# Patient Record
Sex: FEMALE | Race: WHITE | NOT HISPANIC OR LATINO | Employment: OTHER | URBAN - METROPOLITAN AREA
[De-identification: names, ages, dates, MRNs, and addresses within clinical notes are randomized per-mention and may not be internally consistent; named-entity substitution may affect disease eponyms.]

---

## 2022-09-10 ENCOUNTER — HOSPITAL ENCOUNTER (INPATIENT)
Facility: MEDICAL CENTER | Age: 72
LOS: 1 days | DRG: 183 | End: 2022-09-12
Attending: EMERGENCY MEDICINE | Admitting: STUDENT IN AN ORGANIZED HEALTH CARE EDUCATION/TRAINING PROGRAM
Payer: COMMERCIAL

## 2022-09-10 DIAGNOSIS — R79.89 ELEVATED TROPONIN: ICD-10-CM

## 2022-09-10 DIAGNOSIS — S22.42XA CLOSED FRACTURE OF MULTIPLE RIBS OF LEFT SIDE, INITIAL ENCOUNTER: ICD-10-CM

## 2022-09-10 DIAGNOSIS — S32.009A CLOSED FRACTURE OF TRANSVERSE PROCESS OF LUMBAR VERTEBRA, INITIAL ENCOUNTER (HCC): ICD-10-CM

## 2022-09-10 PROCEDURE — 99285 EMERGENCY DEPT VISIT HI MDM: CPT

## 2022-09-10 PROCEDURE — 36415 COLL VENOUS BLD VENIPUNCTURE: CPT

## 2022-09-10 PROCEDURE — 83930 ASSAY OF BLOOD OSMOLALITY: CPT

## 2022-09-10 ASSESSMENT — FIBROSIS 4 INDEX: FIB4 SCORE: 2.35

## 2022-09-11 ENCOUNTER — APPOINTMENT (OUTPATIENT)
Dept: CARDIOLOGY | Facility: MEDICAL CENTER | Age: 72
DRG: 183 | End: 2022-09-11
Attending: STUDENT IN AN ORGANIZED HEALTH CARE EDUCATION/TRAINING PROGRAM
Payer: COMMERCIAL

## 2022-09-11 PROBLEM — N17.9 AKI (ACUTE KIDNEY INJURY) (HCC): Status: ACTIVE | Noted: 2022-09-11

## 2022-09-11 PROBLEM — J96.00 ACUTE RESPIRATORY FAILURE (HCC): Status: ACTIVE | Noted: 2022-09-11

## 2022-09-11 PROBLEM — S22.39XA RIB FRACTURE: Status: ACTIVE | Noted: 2022-09-11

## 2022-09-11 PROBLEM — E87.1 HYPONATREMIA: Status: ACTIVE | Noted: 2022-09-11

## 2022-09-11 PROBLEM — R79.89 ELEVATED TROPONIN: Status: ACTIVE | Noted: 2022-09-11

## 2022-09-11 PROBLEM — J96.01 ACUTE RESPIRATORY FAILURE WITH HYPOXIA (HCC): Status: ACTIVE | Noted: 2022-09-11

## 2022-09-11 PROBLEM — J96.00 ACUTE RESPIRATORY FAILURE DUE TO COVID-19 (HCC): Status: ACTIVE | Noted: 2022-09-11

## 2022-09-11 PROBLEM — D64.9 ANEMIA: Status: ACTIVE | Noted: 2022-09-11

## 2022-09-11 PROBLEM — U07.1 ACUTE RESPIRATORY FAILURE DUE TO COVID-19 (HCC): Status: ACTIVE | Noted: 2022-09-11

## 2022-09-11 LAB
ANION GAP SERPL CALC-SCNC: 7 MMOL/L (ref 7–16)
BASOPHILS # BLD AUTO: 0.8 % (ref 0–1.8)
BASOPHILS # BLD: 0.09 K/UL (ref 0–0.12)
BUN SERPL-MCNC: 27 MG/DL (ref 8–22)
CALCIUM SERPL-MCNC: 8.7 MG/DL (ref 8.5–10.5)
CHLORIDE SERPL-SCNC: 96 MMOL/L (ref 96–112)
CK SERPL-CCNC: 92 U/L (ref 0–154)
CO2 SERPL-SCNC: 27 MMOL/L (ref 20–33)
CREAT SERPL-MCNC: 1.47 MG/DL (ref 0.5–1.4)
EKG IMPRESSION: NORMAL
EOSINOPHIL # BLD AUTO: 0.06 K/UL (ref 0–0.51)
EOSINOPHIL NFR BLD: 0.5 % (ref 0–6.9)
ERYTHROCYTE [DISTWIDTH] IN BLOOD BY AUTOMATED COUNT: 51.8 FL (ref 35.9–50)
GFR SERPLBLD CREATININE-BSD FMLA CKD-EPI: 37 ML/MIN/1.73 M 2
GLUCOSE SERPL-MCNC: 135 MG/DL (ref 65–99)
HCT VFR BLD AUTO: 32.1 % (ref 37–47)
HGB BLD-MCNC: 10.9 G/DL (ref 12–16)
IMM GRANULOCYTES # BLD AUTO: 0.07 K/UL (ref 0–0.11)
IMM GRANULOCYTES NFR BLD AUTO: 0.6 % (ref 0–0.9)
LV EJECT FRACT  99904: 60
LV EJECT FRACT MOD 2C 99903: 58.08
LV EJECT FRACT MOD 4C 99902: 65.99
LV EJECT FRACT MOD BP 99901: 61.88
LYMPHOCYTES # BLD AUTO: 1.09 K/UL (ref 1–4.8)
LYMPHOCYTES NFR BLD: 9.4 % (ref 22–41)
MAGNESIUM SERPL-MCNC: 1.3 MG/DL (ref 1.5–2.5)
MCH RBC QN AUTO: 35.7 PG (ref 27–33)
MCHC RBC AUTO-ENTMCNC: 34 G/DL (ref 33.6–35)
MCV RBC AUTO: 105.2 FL (ref 81.4–97.8)
MONOCYTES # BLD AUTO: 1.3 K/UL (ref 0–0.85)
MONOCYTES NFR BLD AUTO: 11.2 % (ref 0–13.4)
NEUTROPHILS # BLD AUTO: 9 K/UL (ref 2–7.15)
NEUTROPHILS NFR BLD: 77.5 % (ref 44–72)
NRBC # BLD AUTO: 0 K/UL
NRBC BLD-RTO: 0 /100 WBC
NT-PROBNP SERPL IA-MCNC: 4572 PG/ML (ref 0–125)
OSMOLALITY SERPL: 285 MOSM/KG H2O (ref 278–298)
OSMOLALITY UR: 483 MOSM/KG H2O (ref 300–900)
PHOSPHATE SERPL-MCNC: 3.3 MG/DL (ref 2.5–4.5)
PLATELET # BLD AUTO: 301 K/UL (ref 164–446)
PMV BLD AUTO: 9.5 FL (ref 9–12.9)
POTASSIUM SERPL-SCNC: 3.8 MMOL/L (ref 3.6–5.5)
PROCALCITONIN SERPL-MCNC: 0.14 NG/ML
RBC # BLD AUTO: 3.05 M/UL (ref 4.2–5.4)
SODIUM SERPL-SCNC: 130 MMOL/L (ref 135–145)
SODIUM SERPL-SCNC: 131 MMOL/L (ref 135–145)
SODIUM SERPL-SCNC: 133 MMOL/L (ref 135–145)
SODIUM UR-SCNC: 60 MMOL/L
TROPONIN T SERPL-MCNC: 100 NG/L (ref 6–19)
TROPONIN T SERPL-MCNC: 108 NG/L (ref 6–19)
TROPONIN T SERPL-MCNC: 64 NG/L (ref 6–19)
TROPONIN T SERPL-MCNC: 71 NG/L (ref 6–19)
TROPONIN T SERPL-MCNC: 89 NG/L (ref 6–19)
WBC # BLD AUTO: 11.6 K/UL (ref 4.8–10.8)

## 2022-09-11 PROCEDURE — 80048 BASIC METABOLIC PNL TOTAL CA: CPT

## 2022-09-11 PROCEDURE — 93306 TTE W/DOPPLER COMPLETE: CPT | Mod: 26 | Performed by: INTERNAL MEDICINE

## 2022-09-11 PROCEDURE — 36415 COLL VENOUS BLD VENIPUNCTURE: CPT

## 2022-09-11 PROCEDURE — 84300 ASSAY OF URINE SODIUM: CPT

## 2022-09-11 PROCEDURE — 84484 ASSAY OF TROPONIN QUANT: CPT | Mod: 91

## 2022-09-11 PROCEDURE — 83735 ASSAY OF MAGNESIUM: CPT

## 2022-09-11 PROCEDURE — 96365 THER/PROPH/DIAG IV INF INIT: CPT

## 2022-09-11 PROCEDURE — 99221 1ST HOSP IP/OBS SF/LOW 40: CPT | Performed by: SURGERY

## 2022-09-11 PROCEDURE — 83935 ASSAY OF URINE OSMOLALITY: CPT

## 2022-09-11 PROCEDURE — 700102 HCHG RX REV CODE 250 W/ 637 OVERRIDE(OP): Performed by: STUDENT IN AN ORGANIZED HEALTH CARE EDUCATION/TRAINING PROGRAM

## 2022-09-11 PROCEDURE — 99223 1ST HOSP IP/OBS HIGH 75: CPT | Performed by: STUDENT IN AN ORGANIZED HEALTH CARE EDUCATION/TRAINING PROGRAM

## 2022-09-11 PROCEDURE — 82550 ASSAY OF CK (CPK): CPT

## 2022-09-11 PROCEDURE — 83880 ASSAY OF NATRIURETIC PEPTIDE: CPT

## 2022-09-11 PROCEDURE — 96372 THER/PROPH/DIAG INJ SC/IM: CPT

## 2022-09-11 PROCEDURE — 93306 TTE W/DOPPLER COMPLETE: CPT

## 2022-09-11 PROCEDURE — 700111 HCHG RX REV CODE 636 W/ 250 OVERRIDE (IP): Performed by: STUDENT IN AN ORGANIZED HEALTH CARE EDUCATION/TRAINING PROGRAM

## 2022-09-11 PROCEDURE — 93005 ELECTROCARDIOGRAM TRACING: CPT | Performed by: EMERGENCY MEDICINE

## 2022-09-11 PROCEDURE — 96366 THER/PROPH/DIAG IV INF ADDON: CPT

## 2022-09-11 PROCEDURE — 85025 COMPLETE CBC W/AUTO DIFF WBC: CPT

## 2022-09-11 PROCEDURE — 84100 ASSAY OF PHOSPHORUS: CPT

## 2022-09-11 PROCEDURE — A9270 NON-COVERED ITEM OR SERVICE: HCPCS | Performed by: STUDENT IN AN ORGANIZED HEALTH CARE EDUCATION/TRAINING PROGRAM

## 2022-09-11 PROCEDURE — 84295 ASSAY OF SERUM SODIUM: CPT

## 2022-09-11 PROCEDURE — 84145 PROCALCITONIN (PCT): CPT

## 2022-09-11 PROCEDURE — 770020 HCHG ROOM/CARE - TELE (206)

## 2022-09-11 RX ORDER — POLYETHYLENE GLYCOL 3350 17 G/17G
1 POWDER, FOR SOLUTION ORAL
Status: DISCONTINUED | OUTPATIENT
Start: 2022-09-11 | End: 2022-09-12 | Stop reason: HOSPADM

## 2022-09-11 RX ORDER — LABETALOL HYDROCHLORIDE 5 MG/ML
10 INJECTION, SOLUTION INTRAVENOUS EVERY 4 HOURS PRN
Status: DISCONTINUED | OUTPATIENT
Start: 2022-09-11 | End: 2022-09-12 | Stop reason: HOSPADM

## 2022-09-11 RX ORDER — VALSARTAN AND HYDROCHLOROTHIAZIDE 160; 12.5 MG/1; MG/1
1 TABLET, FILM COATED ORAL DAILY
COMMUNITY

## 2022-09-11 RX ORDER — BISACODYL 10 MG
10 SUPPOSITORY, RECTAL RECTAL
Status: DISCONTINUED | OUTPATIENT
Start: 2022-09-11 | End: 2022-09-12 | Stop reason: HOSPADM

## 2022-09-11 RX ORDER — AMOXICILLIN 250 MG
2 CAPSULE ORAL 2 TIMES DAILY
Status: DISCONTINUED | OUTPATIENT
Start: 2022-09-11 | End: 2022-09-12 | Stop reason: HOSPADM

## 2022-09-11 RX ORDER — OMEPRAZOLE 20 MG/1
20 CAPSULE, DELAYED RELEASE ORAL DAILY
Status: DISCONTINUED | OUTPATIENT
Start: 2022-09-11 | End: 2022-09-12 | Stop reason: HOSPADM

## 2022-09-11 RX ORDER — ENOXAPARIN SODIUM 100 MG/ML
40 INJECTION SUBCUTANEOUS DAILY
Status: DISCONTINUED | OUTPATIENT
Start: 2022-09-11 | End: 2022-09-12 | Stop reason: HOSPADM

## 2022-09-11 RX ORDER — DULOXETIN HYDROCHLORIDE 60 MG/1
60 CAPSULE, DELAYED RELEASE ORAL
COMMUNITY
Start: 2022-07-29

## 2022-09-11 RX ORDER — SODIUM CHLORIDE 1 G/1
1 TABLET ORAL DAILY
COMMUNITY

## 2022-09-11 RX ORDER — ACETAMINOPHEN 325 MG/1
650 TABLET ORAL EVERY 6 HOURS PRN
Status: DISCONTINUED | OUTPATIENT
Start: 2022-09-11 | End: 2022-09-12 | Stop reason: HOSPADM

## 2022-09-11 RX ORDER — ONDANSETRON 4 MG/1
4 TABLET, ORALLY DISINTEGRATING ORAL EVERY 4 HOURS PRN
Status: DISCONTINUED | OUTPATIENT
Start: 2022-09-11 | End: 2022-09-12 | Stop reason: HOSPADM

## 2022-09-11 RX ORDER — PANTOPRAZOLE SODIUM 40 MG/1
40 TABLET, DELAYED RELEASE ORAL
COMMUNITY
Start: 2022-03-15

## 2022-09-11 RX ORDER — IPRATROPIUM BROMIDE AND ALBUTEROL SULFATE 2.5; .5 MG/3ML; MG/3ML
3 SOLUTION RESPIRATORY (INHALATION)
Status: DISCONTINUED | OUTPATIENT
Start: 2022-09-11 | End: 2022-09-12 | Stop reason: HOSPADM

## 2022-09-11 RX ORDER — MAGNESIUM SULFATE HEPTAHYDRATE 40 MG/ML
4 INJECTION, SOLUTION INTRAVENOUS ONCE
Status: COMPLETED | OUTPATIENT
Start: 2022-09-11 | End: 2022-09-11

## 2022-09-11 RX ORDER — PANTOPRAZOLE SODIUM 40 MG/1
40 TABLET, DELAYED RELEASE ORAL
Status: DISCONTINUED | OUTPATIENT
Start: 2022-09-11 | End: 2022-09-11

## 2022-09-11 RX ORDER — DULOXETIN HYDROCHLORIDE 60 MG/1
60 CAPSULE, DELAYED RELEASE ORAL DAILY
Status: DISCONTINUED | OUTPATIENT
Start: 2022-09-11 | End: 2022-09-12 | Stop reason: HOSPADM

## 2022-09-11 RX ORDER — ATORVASTATIN CALCIUM 40 MG/1
40 TABLET, FILM COATED ORAL DAILY
Status: DISCONTINUED | OUTPATIENT
Start: 2022-09-11 | End: 2022-09-12 | Stop reason: HOSPADM

## 2022-09-11 RX ORDER — ATORVASTATIN CALCIUM 10 MG/1
10 TABLET, FILM COATED ORAL DAILY
COMMUNITY

## 2022-09-11 RX ORDER — ONDANSETRON 2 MG/ML
4 INJECTION INTRAMUSCULAR; INTRAVENOUS EVERY 4 HOURS PRN
Status: DISCONTINUED | OUTPATIENT
Start: 2022-09-11 | End: 2022-09-12 | Stop reason: HOSPADM

## 2022-09-11 RX ADMIN — ASPIRIN 81 MG: 81 TABLET, COATED ORAL at 05:42

## 2022-09-11 RX ADMIN — ATORVASTATIN CALCIUM 40 MG: 40 TABLET, FILM COATED ORAL at 05:42

## 2022-09-11 RX ADMIN — OMEPRAZOLE 20 MG: 20 CAPSULE, DELAYED RELEASE ORAL at 05:42

## 2022-09-11 RX ADMIN — MAGNESIUM SULFATE HEPTAHYDRATE 4 G: 40 INJECTION, SOLUTION INTRAVENOUS at 04:05

## 2022-09-11 RX ADMIN — ENOXAPARIN SODIUM 40 MG: 40 INJECTION SUBCUTANEOUS at 18:11

## 2022-09-11 RX ADMIN — DULOXETINE HYDROCHLORIDE 60 MG: 60 CAPSULE, DELAYED RELEASE ORAL at 05:42

## 2022-09-11 ASSESSMENT — COGNITIVE AND FUNCTIONAL STATUS - GENERAL
STANDING UP FROM CHAIR USING ARMS: A LITTLE
SUGGESTED CMS G CODE MODIFIER DAILY ACTIVITY: CH
CLIMB 3 TO 5 STEPS WITH RAILING: A LITTLE
MOVING TO AND FROM BED TO CHAIR: A LITTLE
DAILY ACTIVITIY SCORE: 24
SUGGESTED CMS G CODE MODIFIER MOBILITY: CJ
MOBILITY SCORE: 20
WALKING IN HOSPITAL ROOM: A LITTLE

## 2022-09-11 ASSESSMENT — ENCOUNTER SYMPTOMS
CONSTIPATION: 0
SPEECH CHANGE: 0
WEAKNESS: 1
SHORTNESS OF BREATH: 0
FEVER: 0
PALPITATIONS: 0
SENSORY CHANGE: 0
VOMITING: 1
NERVOUS/ANXIOUS: 0
NAUSEA: 1
CHILLS: 0
PHOTOPHOBIA: 0
ABDOMINAL PAIN: 0
SINUS PAIN: 0
FOCAL WEAKNESS: 0
COUGH: 0
NECK PAIN: 0
DOUBLE VISION: 0
FALLS: 1
DIARRHEA: 0
PND: 0
ORTHOPNEA: 0

## 2022-09-11 ASSESSMENT — PATIENT HEALTH QUESTIONNAIRE - PHQ9
1. LITTLE INTEREST OR PLEASURE IN DOING THINGS: NOT AT ALL
SUM OF ALL RESPONSES TO PHQ9 QUESTIONS 1 AND 2: 0
2. FEELING DOWN, DEPRESSED, IRRITABLE, OR HOPELESS: NOT AT ALL

## 2022-09-11 ASSESSMENT — LIFESTYLE VARIABLES: SUBSTANCE_ABUSE: 0

## 2022-09-11 NOTE — PROGRESS NOTES
I saw Katey Mckee 72 y.o. female who has a history of  coronary artery disease, hypertension, hyperlipidemia, diabetes mellitus, GERD, depression, chronic low back pain with old compression fracturesright scapular fracture set to go orthopedic repair 1 month in [place] presents with Fall (Transfer from [place] for MGLF lumbar fx, rib fx, elevated trop)   on 9/10/2022   She is visiting from [place] for a friend reunion at [place]. She was carsick in the back of a van for 2 hours, episodes of vomiting and feeling weak. At dinner she was fine. Later that evening she felt malaised. NDay of admission n she was going to do paddle boating when she felt dizzy and fell on her L side.  She was at an OSH ED where imaging showed rib fractures and age indeterminate transverse fractures along with old compression fractures and an elevated troponin. CT head there no acute bleed or injury. CTA Chest no PE, no consolidation or effusion, no mention of contusion. No pathology on CT Ab  At the ED , she is afebrile, sometimes tachycardic, HYPOXIC requiring 2LO2.   EKG sinus  Mild leukocytosis  Mild anemia,  Troponin 108  Echo ordered and PENDING  When I saw her at ED, no acute distress. She has back pain. Nursing was asking if she can be moved.  A/P  Fall, possible new transverse compression fractures, new rib fractures    COnsulted Dr. Zepeda, Trauma to r/o any more injuried and recs for fx. OK to move. No evidence of pneumonia but continue pulmonary toilet for rib fx. Pain control. OK to have PT/OT I have informed the nurse. Trend troponin.    I spent time talking to Dr. Carbajal, speaking and consulting with Duane Martinez, revieweing imaging and speaking with nursing, PT and patient  9/11/2022 [id number] then [id number]

## 2022-09-11 NOTE — ED NOTES
Pt resting bed, family at bedside. Compliant with IS, attempted to ween pt off oxygen but dropped spo2 to 75% on room air at rest  Placed pt back on 2L nc

## 2022-09-11 NOTE — ASSESSMENT & PLAN NOTE
Hypovolumic hyponatremia  Admission Sodium: 130   Monitor closely Q12 BMP   No more then 10-12 meq correction in 24hrs to prevent CPM  She received 1 L fluid in the ED.

## 2022-09-11 NOTE — ASSESSMENT & PLAN NOTE
Oxygen per guidelines, wean as able  RT consult, continuous pulse ox, incentive spirometry  WBC 11.6 Check procalcitonin  Check TTE  CTA chest negative for PE shows acute posterior left 11th and 12th grade fractures no pneumothorax, no consolidation edema or effusion.

## 2022-09-11 NOTE — ED PROVIDER NOTES
ER Provider Note     Scribed for Reji San M.D. by Anthony Martin. 9/11/2022, 12:11 AM.    Primary Care Provider: Pcp Not noted  Means of Arrival: EMS   History obtained from: Patient  History limited by: None     CHIEF COMPLAINT  Chief Complaint   Patient presents with    Fall     Transfer from [place] for MGLF lumbar fx, rib fx, elevated trop       HPI  Katey Mckee is a 72 y.o. female who presents to the Emergency Department via EMS as a transfer from [place] for evaluation of a ground level fall onset earlier today. The patient states she also has chronic lower back pain, generalized pain, and dizziness, but denies any chest pain or fever. She reports falling to the side and hitting her head and back from a sitting position. She describes being evaluated for these symptoms at [facility], where she was determined to have elevated Troponin, a lumbar fracture, and a rib fracture. She reports a recent history of right shoulder replacement 1 year ago, and notes that she made an effort not to fall onto this shoulder. She is scheduled for another surgery on this shoulder in 1 month. She adds that she recently spent 14 hours in a car while in transit for a wine tasting, after which she began to experience her generalized pain.    REVIEW OF SYSTEMS  See HPI for further details. All other systems are negative.     PAST MEDICAL HISTORY   has a past medical history of CAD (coronary artery disease), Diabetes (HCC), and Hypertension.    SURGICAL HISTORY  patient denies any surgical history    SOCIAL HISTORY  Social History     Tobacco Use    Smoking status: Never    Smokeless tobacco: Never   Vaping Use    Vaping Use: Never used      Social History     Substance and Sexual Activity   Drug Use Not noted       FAMILY HISTORY  History reviewed. No pertinent family history.    CURRENT MEDICATIONS  Current Outpatient Medications   Medication Instructions    ATORVASTATIN CALCIUM PO Oral    DULOXETINE HCL PO Oral     "insulin lispro (HUMALOG) 100 UNIT/ML Subcutaneous, 3 TIMES DAILY BEFORE MEALS    PANTOPRAZOLE SODIUM PO Oral    VALSARTAN PO Oral      ALLERGIES  Allergies   Allergen Reactions    Morphine     Penicillins     Sulfa Drugs     Trazodone     Tylenol        PHYSICAL EXAM  VITAL SIGNS: BP (!) 140/67   Pulse 93   Temp 36.7 °C (98 °F) (Temporal)   Resp 18   Ht 1.676 m (5' 6\")   Wt 79.4 kg (175 lb)   SpO2 93%   BMI 28.25 kg/m²      Constitutional: Alert in no apparent distress.  HENT: No signs of trauma, Bilateral external ears normal, Nose normal.   Eyes: Pupils are equal and reactive, Conjunctiva normal, Non-icteric.   Neck: Normal range of motion, No tenderness, Supple, No stridor.   Lymphatic: No lymphadenopathy noted.   Cardiovascular: Regular rate and rhythm, no palpable thrill  Thorax & Lungs: No respiratory distress,  No chest tenderness.  CTAB  Abdomen: Bowel sounds normal, Soft, No tenderness, No masses, No pulsatile masses. No peritoneal signs.  Skin: Warm, Dry, No erythema, No rash.   Back: Mild lumbar spinal tenderness to palpation. No CVA tenderness.   Extremities: Intact distal pulses, No edema, No tenderness, No cyanosis.  Musculoskeletal: Good range of motion in all major joints. No tenderness to palpation or major deformities noted.   Neurologic: Alert , Normal motor function, Normal sensory function, No focal deficits noted.   Psychiatric: Affect normal, Judgment normal, Mood normal.     DIAGNOSTIC STUDIES / PROCEDURES    EKG Interpretation:  Interpreted by me    12 Lead EKG interpreted by me to show:  Normal sinus rhythm  Rate 89  Axis: Normal  Intervals: Normal  Normal T waves  Normal ST segments  My impression of this EKG: Does not indicate ischemia or arrhythmia at this time.     LABS  Labs Reviewed   CBC WITH DIFFERENTIAL - Abnormal; Notable for the following components:       Result Value    WBC 11.6 (*)     RBC 3.05 (*)     Hemoglobin 10.9 (*)     Hematocrit 32.1 (*)     .2 (*)     " MCH 35.7 (*)     RDW 51.8 (*)     Neutrophils-Polys 77.50 (*)     Lymphocytes 9.40 (*)     Neutrophils (Absolute) 9.00 (*)     Monos (Absolute) 1.30 (*)     All other components within normal limits    Narrative:     Biotin intake of greater than 5 mg per day may interfere with  troponin levels, causing false low values.   BASIC METABOLIC PANEL - Abnormal; Notable for the following components:    Sodium 130 (*)     Glucose 135 (*)     Bun 27 (*)     Creatinine 1.47 (*)     All other components within normal limits    Narrative:     Biotin intake of greater than 5 mg per day may interfere with  troponin levels, causing false low values.   TROPONIN - Abnormal; Notable for the following components:    Troponin T 108 (*)     All other components within normal limits    Narrative:     Biotin intake of greater than 5 mg per day may interfere with  troponin levels, causing false low values.   ESTIMATED GFR - Abnormal; Notable for the following components:    GFR (CKD-EPI) 37 (*)     All other components within normal limits    Narrative:     Biotin intake of greater than 5 mg per day may interfere with  troponin levels, causing false low values.     All labs reviewed by me.    COURSE & MEDICAL DECISION MAKING  Pertinent Labs & Imaging studies reviewed. (See chart for details)    This is a 72 y.o. female that presents via EMS as a transfer from [place] for evaluation of a ground level fall onset earlier today.  The patient does have some traumatic findings from the fall however mainly there is concern about her elevated troponin.  There is no significant tenderness on exam.  We will admit her to the hospitalist.  She does have transverse process fractures.  These are in the lumbar region.  These thoracic fractures do appear old.  We will get a screening EKG as well as troponin and reassess.    12:11 AM - Patient seen and examined at bedside. Ordered CBC w/diff, BMP, Troponin, and EKG to evaluate. I informed the patient of my  plan to admit today given the patient's current presentation and diagnostic study results. Patient verbalizes understanding and support with my plan for admission.      12:42 AM - Paged Hospitalist.    12:50 AM - Per nursing the patient is requesting water at this time. The patient has been approved for water PO.    1:15 AM - I discussed the patient's case and the above findings with Dr. Carbajal (Hospitalist) who agrees to evaluate the patient for hospitalization.       The patient does have an elevated heart score.  The troponin is 108.  We will trend this.  We will admit the patient to the hospitalist.  DISPOSITION:  Patient will be hospitalized by Dr. Carbajal (Hospitalist) in guarded condition.     FINAL IMPRESSION  1. Closed fracture of multiple ribs of left side, initial encounter    2. Elevated troponin    3. Closed fracture of transverse process of lumbar vertebra, initial encounter ([facility])          Anthony GALAVIZ (Scribe), am scribing for, and in the presence of, Reji San M.D..    Electronically signed by: Anthony Martin ([place]), 9/11/2022    Reji GALAVIZ M.D. personally performed the services described in this documentation, as scribed by Anthony Martin in my presence, and it is both accurate and complete. C.    The note accurately reflects work and decisions made by me.  Reji San M.D.  9/11/2022  6:09 AM

## 2022-09-11 NOTE — ED NOTES
Incentive spirometer provided, instruction given , returned demonstration   Blood and urine sent to lab  Replaced canister for pure wick.

## 2022-09-11 NOTE — CONSULTS
"    CHIEF COMPLAINT: Fall from standing.     HISTORY OF PRESENT ILLNESS: The patient is a 72 year-old White woman who was injured in a fall. The patient suffered a mechanical ground-level fall. The patient had no loss of consciousness. The patient denies any chronic anticoagulation or antiplatelet medications. The patient is unable to articulate any subjective complaints.  Patient is here from out of town.  She had several bad days including dizziness nausea and vomiting which she thinks is related to carsickness.  There is also a chance that she is dehydrated.  She said that she fell from standing when she was incredibly weak.  She did hit her head but denies losing consciousness.  She does take aspirin every day.  Head CT was negative.  On full body scan she was noted to have 2 rib fractures although she has incredibly minimal pain with even aggressive palpation.  She is also noted to have lumbar transverse process fractures which are minor and nondisplaced.  She does not complain of any pain in her back aside from her typical back discomfort which she says she does daily exercises for.  She is a type I diabetic and has her insulin pump in place.  She is being evaluated by the medical service for elevated troponins and acute renal injury.    TRIAGE CATEGORY: The patient was triaged as a Non-Trauma activation. An expeditious primary and secondary survey with required adjuncts was conducted. See Trauma Narrator for full details.    PAST MEDICAL HISTORY:  has a past medical history of CAD (coronary artery disease), Diabetes (HCC), and Hypertension.    PAST SURGICAL HISTORY:  has no past surgical history on file.    ALLERGIES:   Allergies   Allergen Reactions    Penicillins Anaphylaxis    Sulfa Drugs Anaphylaxis    Trazodone Unspecified     Per the patient it caused \"Uncontrollable Orgasms\"    Morphine Rash     Rash    Tylenol Unspecified     Messes with Glucose [place]       CURRENT MEDICATIONS:   Home Medications  " "     Reviewed by Jaylyn Resendiz (Pharmacy Tech) on 09/11/22 at 0104  Med List Status: Complete     Medication Last Dose Status   atorvastatin (LIPITOR) 10 MG Tab 9/11/2022 Active   Cyanocobalamin (B-12 PO) 9/11/2022 Active   DULoxetine (CYMBALTA) 60 MG Cap DR Particles delayed-release capsule 9/11/2022 Active   insulin lispro (HUMALOG) 100 UNIT/ML 9/11/2022 Active   MAGNESIUM PO >1 week Active   Omega-3 Fatty Acids (FISH OIL PO) 9/11/2022 Active   pantoprazole (PROTONIX) 40 MG Tablet Delayed Response 9/11/2022 Active   POTASSIUM PO >1 week Active   sodium chloride (SALT) 1 GM Tab 9/11/2022 Active   valsartan-hydrochlorothiazide (DIOVAN-HCT) 160-12.5 MG per tablet 9/11/2022 Active                    FAMILY HISTORY: family history is not on file.    SOCIAL HISTORY:  reports that she has never smoked. She has never used smokeless tobacco.    REVIEW OF SYSTEMS: Comprehensive review of systems is negative with the exception of the aforementioned HPI, PMH, and PSH bullets in accordance with CMS guidelines.    PHYSICAL EXAMINATION:      Vital Signs: BP (!) 187/85   Pulse 90   Temp 36.7 °C (98 °F) (Temporal)   Resp 19   Ht 1.676 m (5' 6\")   Wt 79.4 kg (175 lb)   SpO2 99%   Physical Exam  Vitals and nursing note reviewed. Exam conducted with a chaperone present.   Constitutional:       Appearance: Normal appearance. She is not ill-appearing.   HENT:      Head: Normocephalic.      Right Ear: External ear normal.      Left Ear: External ear normal.      Nose: Nose normal.      Mouth/Throat:      Mouth: Mucous membranes are moist.      Pharynx: Oropharynx is clear.   Eyes:      Pupils: Pupils are equal, round, and reactive to light.   Cardiovascular:      Rate and Rhythm: Normal rate and regular rhythm.      Pulses: Normal pulses.   Pulmonary:      Effort: Pulmonary effort is normal.      Breath sounds: Normal breath sounds.      Comments: Very mild pain with deep palpation of the ribs.   Abdominal:      General: " Bowel sounds are normal. There is no distension.      Palpations: Abdomen is soft.   Genitourinary:     General: Normal vulva.   Musculoskeletal:         General: Normal range of motion.      Cervical back: Normal range of motion.      Comments: No back tenderness aside from her normal back discomfort   Skin:     General: Skin is warm and dry.      Capillary Refill: Capillary refill takes less than 2 seconds.   Neurological:      General: No focal deficit present.      Mental Status: She is alert.   Psychiatric:         Mood and Affect: Mood normal.         Behavior: Behavior normal.         Thought Content: Thought content normal.       LABORATORY VALUES:   Recent Labs     09/10/22  1940 09/11/22  0010   WBC 14.4* 11.6*   RBC 3.37* 3.05*   HEMOGLOBIN 11.8* 10.9*   HEMATOCRIT 35.4* 32.1*   .0* 105.2*   MCH 35.0* 35.7*   MCHC 33.3 34.0   RDW 13.5 51.8*   PLATELETCT 353 301   MPV 10.0 9.5     Recent Labs     09/10/22  1940 09/11/22  0010 09/11/22  0812   SODIUM 132* 130* 131*   POTASSIUM 3.8 3.8  --    CHLORIDE 94* 96  --    CO2 25 27  --    GLUCOSE 54* 135*  --    BUN 31* 27*  --    CREATININE 1.8* 1.47*  --    CALCIUM 9.5 8.7  --      Recent Labs     09/10/22  1940   ASTSGOT 52*   ALTSGPT 21   TBILIRUBIN 0.7   ALKPHOSPHAT 58            IMAGING:   EC-ECHOCARDIOGRAM COMPLETE W/O CONT             ASSESSMENT AND PLAN:     DISPOSITION: Medical evaluation and admission.  Trauma tertiary survey.    Rib 11/12 on the left are fractured- pulmonary toilet and IS.  Ibuprofen for pain control.     L 1/2/3 transverse process fractures- Minimal pain, patient reports it is no different from her daily back pain.  Pain control with ibuprofen.     No need for additional trauma evaluation.  Will drop to standby.     CRITICAL CARE TIME: 45 minutes excluding procedures.       ____________________________________     Pam Zepeda M.D.    DD: 9/11/2022  10:14 AM

## 2022-09-11 NOTE — ASSESSMENT & PLAN NOTE
Troponin 108->100 no chest pain.  EKG sinus rhythm with PACs no ST T-segment elevation or depressions.  Check TTE.  Trend troponin.  Repeat EKG if she has any chest pain.

## 2022-09-11 NOTE — ASSESSMENT & PLAN NOTE
Suspecting Pre-renal etiology   Check U/a & CPK  Rule out post obstruction  IVF  Renal dose meds and avoid nephrotoxins  Monitor I&O's  Follow renal function

## 2022-09-11 NOTE — ED NOTES
Med rec completed per patient  Allergies reviewed  No PO Antibiotics in the last 30 days     Patient takes humalog on sliding scale depending on her glucose reading. Unable to confirm exact sliding scale.

## 2022-09-11 NOTE — H&P
Hospital Medicine History & Physical Note    Date of Service  9/11/2022    Primary Care Physician  Pcp Not In Computer    Consultants  None    Code Status  Full Code    Chief Complaint  Chief Complaint   Patient presents with    Fall     Transfer from [place] for MGLF lumbar fx, rib fx, elevated trop       History of Presenting Illness  Katey Mckee is a 72 y.o. female coronary artery disease, hypertension, hyperlipidemia, diabetes mellitus, GERD, depression, right scapular fracture set to go orthopedic repair 1 month in [place], who presented 9/10/2022 as a transfer from [facility] for elevated troponin.  Patient is here visiting from [place] for a friend reunion at [place].  This past Friday while on her trip she went with on a [employer] tour and she notes she got carsick sitting in the back of the van for 2 hours, she had some episodes of emesis and was feeling weak following that.  Later on that evening she went to dinner and everything was fine.  On the day prior to presentation she had generalized malaise.  On the day of admission her friends went on a paddle wheel boat tour on [facility] and after that she felt very dizzy and unwell, she was nauseous.  She went to stand up and was dizzy and fell back onto her left side.  She was brought to OSH found to have 2 posterior rib fractures, lumbar fracture, and elevated troponin.  Due to the elevated troponin she was transferred to [facility].    In the ED she is afebrile, pulse is ranged from  respiratory rate ranged from 12-22 she was hypoxic to 88% requiring 2 L nasal cannula, systolic blood pressures ranged from 120s to 180s.  In the ED she has mild leukocytosis 11.6 mild anemia 10.9 macrocytic 105.2, sodium 130 BUN 27 serum creatinine 1.47 magnesium 1.3 troponin 108 proBNP 4572 procalcitonin 0.14 EKG sinus rhythm with PACs no ST T-segment elevation or depression.  She denies any recent fevers or chills, headache or vision changes, chest pain cough  "abdominal pain dysuria or diarrhea.  A allergy respond coordinate patient subsequently referred to hospitalist for admission.    I discussed the plan of care with patient, bedside RN, and pharmacy.    Review of Systems  Review of Systems   Constitutional:  Positive for malaise/fatigue. Negative for chills and fever.   HENT:  Negative for congestion and sinus pain.    Eyes:  Negative for double vision and photophobia.   Respiratory:  Negative for cough and shortness of breath.    Cardiovascular:  Negative for chest pain, palpitations, orthopnea and PND.   Gastrointestinal:  Positive for nausea and vomiting. Negative for abdominal pain, constipation and diarrhea.   Genitourinary:  Negative for dysuria and urgency.   Musculoskeletal:  Positive for falls. Negative for neck pain.   Neurological:  Positive for weakness. Negative for sensory change, speech change and focal weakness.   Psychiatric/Behavioral:  Negative for substance abuse. The patient is not nervous/anxious.      Past Medical History   has a past medical history of CAD (coronary artery disease), Diabetes (HCC), and Hypertension.    Surgical History   has no past surgical history on file.     Family History  family history is not on file.   Family history reviewed with patient. There is no family history that is pertinent to the chief complaint.     Social History   reports that she has never smoked. She has never used smokeless tobacco.    Allergies  Allergies   Allergen Reactions    Penicillins Anaphylaxis    Sulfa Drugs Anaphylaxis    Trazodone Unspecified     Per the patient it caused \"Uncontrollable Orgasms\"    Morphine Rash     Rash    Tylenol Unspecified     Messes with Glucose [place]       Medications  Prior to Admission Medications   Prescriptions Last Dose Informant Patient Reported? Taking?   Cyanocobalamin (B-12 PO) 9/11/2022 at AM Patient Yes Yes   Sig: Place 1 Tablet under the tongue every day.   DULoxetine (CYMBALTA) 60 MG Cap DR Particles " delayed-release capsule 9/11/2022 at AM Patient Yes Yes   Sig: Take 60 mg by mouth every day.   MAGNESIUM PO >1 week at [facility] Patient Yes Yes   Sig: Take 1 Tablet by mouth every day.   Omega-3 Fatty Acids (FISH OIL PO) 9/11/2022 at AM Patient Yes Yes   Sig: Take 1 Capsule by mouth every day.   POTASSIUM PO >1 week at [facility] Patient Yes Yes   Sig: Take 1 Tablet by mouth every day.   atorvastatin (LIPITOR) 10 MG Tab 9/11/2022 at AM Patient Yes Yes   Sig: Take 10 mg by mouth every day.   insulin lispro (HUMALOG) 100 UNIT/ML 9/11/2022 at 1800 Patient Yes No   Sig: Inject 1-15 Units under the skin 3 times a day before meals. Unknown sliding scale   pantoprazole (PROTONIX) 40 MG Tablet Delayed Response 9/11/2022 at AM Patient Yes Yes   Sig: Take 40 mg by mouth every day.   sodium chloride (SALT) 1 GM Tab 9/11/2022 at AM Patient Yes Yes   Sig: Take 1 g by mouth every day.   valsartan-hydrochlorothiazide (DIOVAN-HCT) 160-12.5 MG per tablet 9/11/2022 at AM Patient Yes No   Sig: Take 1 Tablet by mouth every day.      Facility-Administered Medications: None       Physical Exam  Temp:  [36.7 °C (98 °F)-37 °C (98.6 °F)] 36.7 °C (98 °F)  Pulse:  [] 91  Resp:  [12-22] 13  BP: (124-185)/(57-91) 144/71  SpO2:  [88 %-100 %] 92 %  Blood Pressure : (!) 154/73   Temperature: 36.7 °C (98 °F)   Pulse: 94   Respiration: (!) 21   Pulse Oximetry: 88 %       Physical Exam  Vitals and nursing note reviewed.   Constitutional:       General: She is not in acute distress.     Appearance: She is not toxic-appearing.   HENT:      Head: Normocephalic and atraumatic.      Nose: Nose normal. No rhinorrhea.      Mouth/Throat:      Mouth: Mucous membranes are dry.      Pharynx: Oropharynx is clear.   Eyes:      Extraocular Movements: Extraocular movements intact.      Pupils: Pupils are equal, round, and reactive to light.      Comments: Right eye blindness, reduced vision left eye   Cardiovascular:      Rate and Rhythm: Normal rate and regular  rhythm.      Pulses: Normal pulses.      Heart sounds: Murmur heard.   Pulmonary:      Effort: No respiratory distress.      Breath sounds: No wheezing, rhonchi or rales.   Abdominal:      Palpations: Abdomen is soft.      Tenderness: There is no abdominal tenderness. There is no guarding or rebound.   Musculoskeletal:         General: Normal range of motion.      Cervical back: Normal range of motion and neck supple. No rigidity.      Right lower leg: No edema.      Left lower leg: No edema.   Skin:     General: Skin is warm and dry.      Capillary Refill: Capillary refill takes less than 2 seconds.   Neurological:      General: No focal deficit present.      Mental Status: She is alert and oriented to person, place, and time. Mental status is at baseline.      Cranial Nerves: No cranial nerve deficit.      Sensory: No sensory deficit.      Motor: No weakness.      Coordination: Coordination normal.   Psychiatric:         Mood and Affect: Mood normal.         Behavior: Behavior normal.         Thought Content: Thought content normal.         Judgment: Judgment normal.       Laboratory:  Recent Labs     09/10/22  1940 09/11/22  0010   WBC 14.4* 11.6*   RBC 3.37* 3.05*   HEMOGLOBIN 11.8* 10.9*   HEMATOCRIT 35.4* 32.1*   .0* 105.2*   MCH 35.0* 35.7*   MCHC 33.3 34.0   RDW 13.5 51.8*   PLATELETCT 353 301   MPV 10.0 9.5     Recent Labs     09/10/22  1940 09/11/22  0010   SODIUM 132* 130*   POTASSIUM 3.8 3.8   CHLORIDE 94* 96   CO2 25 27   GLUCOSE 54* 135*   BUN 31* 27*   CREATININE 1.8* 1.47*   CALCIUM 9.5 8.7     Recent Labs     09/10/22  1940 09/11/22  0010   ALTSGPT 21  --    ASTSGOT 52*  --    ALKPHOSPHAT 58  --    TBILIRUBIN 0.7  --    LIPASE 42  --    GLUCOSE 54* 135*         Recent Labs     09/11/22  0010   NTPROBNP 4572*         Recent Labs     09/11/22 0010 09/11/22  0253   TROPONINT 108* 100*       Imaging:  EC-ECHOCARDIOGRAM COMPLETE W/O CONT    (Results Pending)       X-Ray:  chest  X-ray was  reported as clear without pneumothorax, obtain outside hospital images.  EKG:  My impression is: EKG sinus rhythm with PACs no ST T-segment elevation or depression    Assessment/Plan:  Justification for Admission Status  I anticipate this patient will require at least two midnights for appropriate medical management, necessitating inpatient admission because acute respiratory failure with elevated troponin      * Acute respiratory failure with hypoxia (HCC)- (present on admission)  Assessment & Plan  Oxygen per guidelines, wean as able  RT consult, continuous pulse ox, incentive spirometry  WBC 11.6 Check procalcitonin  Check TTE  CTA chest negative for PE shows acute posterior left 11th and 12th grade fractures no pneumothorax, no consolidation edema or effusion.      Rib fracture- (present on admission)  Assessment & Plan  Posterior 11th and 12th rib status post ground-level fall, no pneumothorax.  Pain control  Incentive spirometry    Elevated troponin- (present on admission)  Assessment & Plan  Troponin 108->100 no chest pain.  EKG sinus rhythm with PACs no ST T-segment elevation or depressions.  Check TTE.  Trend troponin.  Repeat EKG if she has any chest pain.    JAKE (acute kidney injury) (HCC)- (present on admission)  Assessment & Plan  Suspecting Pre-renal etiology   Check U/a & CPK  Rule out post obstruction  IVF  Renal dose meds and avoid nephrotoxins  Monitor I&O's  Follow renal function      Hyponatremia- (present on admission)  Assessment & Plan  Hypovolumic hyponatremia  Admission Sodium: 130   Monitor closely Q12 BMP   No more then 10-12 meq correction in 24hrs to prevent CPM  She received 1 L fluid in the ED.    Anemia- (present on admission)  Assessment & Plan  No reports of bleeding, continue to monitor, transfuse hemoglobin less than 7      VTE prophylaxis: SCDs/TEDs and enoxaparin ppx

## 2022-09-11 NOTE — ASSESSMENT & PLAN NOTE
Posterior 11th and 12th rib status post ground-level fall, no pneumothorax.  Pain control  Incentive spirometry

## 2022-09-12 VITALS
TEMPERATURE: 97.6 F | HEART RATE: 76 BPM | BODY MASS INDEX: 28.2 KG/M2 | DIASTOLIC BLOOD PRESSURE: 76 MMHG | HEIGHT: 66 IN | WEIGHT: 175.49 LBS | OXYGEN SATURATION: 90 % | SYSTOLIC BLOOD PRESSURE: 139 MMHG | RESPIRATION RATE: 17 BRPM

## 2022-09-12 LAB
ALBUMIN SERPL BCP-MCNC: 3.4 G/DL (ref 3.2–4.9)
ALBUMIN/GLOB SERPL: 1.3 G/DL
ALP SERPL-CCNC: 63 U/L (ref 30–99)
ALT SERPL-CCNC: 11 U/L (ref 2–50)
ANION GAP SERPL CALC-SCNC: 8 MMOL/L (ref 7–16)
AST SERPL-CCNC: 23 U/L (ref 12–45)
BASOPHILS # BLD AUTO: 0.8 % (ref 0–1.8)
BASOPHILS # BLD: 0.07 K/UL (ref 0–0.12)
BILIRUB SERPL-MCNC: 0.4 MG/DL (ref 0.1–1.5)
BUN SERPL-MCNC: 17 MG/DL (ref 8–22)
CALCIUM SERPL-MCNC: 8.8 MG/DL (ref 8.5–10.5)
CHLORIDE SERPL-SCNC: 94 MMOL/L (ref 96–112)
CO2 SERPL-SCNC: 30 MMOL/L (ref 20–33)
CREAT SERPL-MCNC: 0.89 MG/DL (ref 0.5–1.4)
EKG IMPRESSION: NORMAL
EOSINOPHIL # BLD AUTO: 0.34 K/UL (ref 0–0.51)
EOSINOPHIL NFR BLD: 3.8 % (ref 0–6.9)
ERYTHROCYTE [DISTWIDTH] IN BLOOD BY AUTOMATED COUNT: 50.6 FL (ref 35.9–50)
GFR SERPLBLD CREATININE-BSD FMLA CKD-EPI: 68 ML/MIN/1.73 M 2
GLOBULIN SER CALC-MCNC: 2.6 G/DL (ref 1.9–3.5)
GLUCOSE SERPL-MCNC: 66 MG/DL (ref 65–99)
HCT VFR BLD AUTO: 33.1 % (ref 37–47)
HGB BLD-MCNC: 11.2 G/DL (ref 12–16)
IMM GRANULOCYTES # BLD AUTO: 0.05 K/UL (ref 0–0.11)
IMM GRANULOCYTES NFR BLD AUTO: 0.6 % (ref 0–0.9)
LYMPHOCYTES # BLD AUTO: 1.01 K/UL (ref 1–4.8)
LYMPHOCYTES NFR BLD: 11.2 % (ref 22–41)
MAGNESIUM SERPL-MCNC: 1.8 MG/DL (ref 1.5–2.5)
MCH RBC QN AUTO: 35.2 PG (ref 27–33)
MCHC RBC AUTO-ENTMCNC: 33.8 G/DL (ref 33.6–35)
MCV RBC AUTO: 104.1 FL (ref 81.4–97.8)
MONOCYTES # BLD AUTO: 0.88 K/UL (ref 0–0.85)
MONOCYTES NFR BLD AUTO: 9.7 % (ref 0–13.4)
NEUTROPHILS # BLD AUTO: 6.68 K/UL (ref 2–7.15)
NEUTROPHILS NFR BLD: 73.9 % (ref 44–72)
NRBC # BLD AUTO: 0 K/UL
NRBC BLD-RTO: 0 /100 WBC
PHOSPHATE SERPL-MCNC: 2.4 MG/DL (ref 2.5–4.5)
PLATELET # BLD AUTO: 287 K/UL (ref 164–446)
PMV BLD AUTO: 9.6 FL (ref 9–12.9)
POTASSIUM SERPL-SCNC: 3.3 MMOL/L (ref 3.6–5.5)
PROT SERPL-MCNC: 6 G/DL (ref 6–8.2)
RBC # BLD AUTO: 3.18 M/UL (ref 4.2–5.4)
SODIUM SERPL-SCNC: 132 MMOL/L (ref 135–145)
WBC # BLD AUTO: 9 K/UL (ref 4.8–10.8)

## 2022-09-12 PROCEDURE — 85025 COMPLETE CBC W/AUTO DIFF WBC: CPT

## 2022-09-12 PROCEDURE — 84100 ASSAY OF PHOSPHORUS: CPT

## 2022-09-12 PROCEDURE — A9270 NON-COVERED ITEM OR SERVICE: HCPCS | Performed by: STUDENT IN AN ORGANIZED HEALTH CARE EDUCATION/TRAINING PROGRAM

## 2022-09-12 PROCEDURE — 700102 HCHG RX REV CODE 250 W/ 637 OVERRIDE(OP): Performed by: STUDENT IN AN ORGANIZED HEALTH CARE EDUCATION/TRAINING PROGRAM

## 2022-09-12 PROCEDURE — 93010 ELECTROCARDIOGRAM REPORT: CPT | Performed by: INTERNAL MEDICINE

## 2022-09-12 PROCEDURE — 99238 HOSP IP/OBS DSCHRG MGMT 30/<: CPT | Mod: GC | Performed by: HOSPITALIST

## 2022-09-12 PROCEDURE — 93005 ELECTROCARDIOGRAM TRACING: CPT | Performed by: STUDENT IN AN ORGANIZED HEALTH CARE EDUCATION/TRAINING PROGRAM

## 2022-09-12 PROCEDURE — 97161 PT EVAL LOW COMPLEX 20 MIN: CPT

## 2022-09-12 PROCEDURE — 83735 ASSAY OF MAGNESIUM: CPT

## 2022-09-12 PROCEDURE — 80053 COMPREHEN METABOLIC PANEL: CPT

## 2022-09-12 PROCEDURE — 700101 HCHG RX REV CODE 250: Performed by: STUDENT IN AN ORGANIZED HEALTH CARE EDUCATION/TRAINING PROGRAM

## 2022-09-12 PROCEDURE — 36415 COLL VENOUS BLD VENIPUNCTURE: CPT

## 2022-09-12 PROCEDURE — 97165 OT EVAL LOW COMPLEX 30 MIN: CPT

## 2022-09-12 RX ORDER — POTASSIUM CHLORIDE 20 MEQ/1
40 TABLET, EXTENDED RELEASE ORAL ONCE
Status: DISCONTINUED | OUTPATIENT
Start: 2022-09-12 | End: 2022-09-12

## 2022-09-12 RX ORDER — IBUPROFEN 800 MG/1
400 TABLET ORAL EVERY 6 HOURS PRN
Status: DISCONTINUED | OUTPATIENT
Start: 2022-09-12 | End: 2022-09-12 | Stop reason: HOSPADM

## 2022-09-12 RX ORDER — VALSARTAN AND HYDROCHLOROTHIAZIDE 160; 12.5 MG/1; MG/1
1 TABLET, FILM COATED ORAL DAILY
Status: DISCONTINUED | OUTPATIENT
Start: 2022-09-12 | End: 2022-09-12

## 2022-09-12 RX ORDER — VALSARTAN 80 MG/1
160 TABLET ORAL
Status: DISCONTINUED | OUTPATIENT
Start: 2022-09-12 | End: 2022-09-12 | Stop reason: HOSPADM

## 2022-09-12 RX ORDER — HYDROCHLOROTHIAZIDE 25 MG/1
12.5 TABLET ORAL
Status: DISCONTINUED | OUTPATIENT
Start: 2022-09-12 | End: 2022-09-12 | Stop reason: HOSPADM

## 2022-09-12 RX ADMIN — VALSARTAN 160 MG: 80 TABLET, FILM COATED ORAL at 08:28

## 2022-09-12 RX ADMIN — ATORVASTATIN CALCIUM 40 MG: 40 TABLET, FILM COATED ORAL at 06:32

## 2022-09-12 RX ADMIN — ASPIRIN 81 MG: 81 TABLET, COATED ORAL at 06:31

## 2022-09-12 RX ADMIN — HYDROCHLOROTHIAZIDE 12.5 MG: 25 TABLET ORAL at 08:28

## 2022-09-12 RX ADMIN — OMEPRAZOLE 20 MG: 20 CAPSULE, DELAYED RELEASE ORAL at 06:32

## 2022-09-12 RX ADMIN — POTASSIUM PHOSPHATE, MONOBASIC AND POTASSIUM PHOSPHATE, DIBASIC 30 MMOL: 224; 236 INJECTION, SOLUTION, CONCENTRATE INTRAVENOUS at 08:29

## 2022-09-12 RX ADMIN — DULOXETINE HYDROCHLORIDE 60 MG: 60 CAPSULE, DELAYED RELEASE ORAL at 06:32

## 2022-09-12 ASSESSMENT — COGNITIVE AND FUNCTIONAL STATUS - GENERAL
MOVING FROM LYING ON BACK TO SITTING ON SIDE OF FLAT BED: A LITTLE
DRESSING REGULAR UPPER BODY CLOTHING: A LITTLE
SUGGESTED CMS G CODE MODIFIER MOBILITY: CJ
SUGGESTED CMS G CODE MODIFIER DAILY ACTIVITY: CI
TURNING FROM BACK TO SIDE WHILE IN FLAT BAD: A LITTLE
DAILY ACTIVITIY SCORE: 23
MOVING TO AND FROM BED TO CHAIR: A LITTLE
MOBILITY SCORE: 21

## 2022-09-12 ASSESSMENT — GAIT ASSESSMENTS
GAIT LEVEL OF ASSIST: SUPERVISED
ASSISTIVE DEVICE: QUAD CANE
DEVIATION: ANTALGIC
DISTANCE (FEET): 500

## 2022-09-12 ASSESSMENT — FIBROSIS 4 INDEX: FIB4 SCORE: 1.76

## 2022-09-12 ASSESSMENT — PAIN DESCRIPTION - PAIN TYPE: TYPE: ACUTE PAIN

## 2022-09-12 ASSESSMENT — ACTIVITIES OF DAILY LIVING (ADL): TOILETING: INDEPENDENT

## 2022-09-12 NOTE — THERAPY
Physical Therapy   Initial Evaluation     Patient Name: Katey Mckee  Age:  72 y.o., Sex:  female  Medical Record #: [id number]  Today's Date: 9/12/2022    Assessment  Patient is a 72 y.o. female transferred from [facility] following GLF, sustained L rib fxs and lumbar TP fxs. Hx includes R shoulder replacement last year and pt pending sx in October. Pt seen for PT evaluation at this time. Spouse and niece present and supportive. Pt appears close to her functional baseline, ambulated with quad cane, no LOB. Reports no concerns with return home and appears functionally capable at this time. Pt will not be actively followed for physical therapy services, however may be seen if requested by physician for 1 more visit within 30 days to address any discharge or equipment needs.    Plan  Recommend Physical Therapy for Evaluation only   DC Equipment Recommendations: None  Discharge Recommendations: Recommend outpatient physical therapy services to address higher level deficits (or resumption of her PT/training at home)     09/12/22 1138   Prior Living Situation   Prior Services None   Housing / Facility 1 Story House   Steps Into Home 0   Steps In Home 0   Bathroom Set up Walk In Shower   Equipment Owned 4-Wheel Walker;Quad Cane   Lives with -  Spouse   Comments spouse present and supportive. pt works with a PT/ at home 2x/wk.   Prior Level of Functional Mobility   Bed Mobility Independent   Transfer Status Independent   Ambulation Independent   Distance Ambulation (Feet) community distances   Assistive Devices Used Quad Cane   Comments very active, recently returned from 3 month trip to [facility]   Cognition    Level of Consciousness Alert   Comments pleasant, cooperative   Balance Assessment   Sitting Balance (Static) Good   Sitting Balance (Dynamic) Good   Standing Balance (Static) Fair +   Standing Balance (Dynamic) Fair   Weight Shift Sitting Good   Weight Shift Standing Fair   Comments standing with SBQC   Gait Analysis    Gait Level Of Assist Supervised   Assistive Device Quad Cane   Distance (Feet) 500   Deviation Antalgic   Weight Bearing Status no restrictions   Comments no LOB   Bed Mobility    Comments NT, up pre/post, reports no concerns   Functional Mobility   Sit to Stand Supervised   Bed, Chair, Wheelchair Transfer Supervised

## 2022-09-12 NOTE — DISCHARGE PLANNING
Case Management Discharge Planning    Admission Date: 9/10/2022  GMLOS: 4.3  ALOS: 1    6-Clicks ADL Score: 24  6-Clicks Mobility Score: 20      Anticipated Discharge Dispo: Discharge Disposition: Discharged to home/self care (01)    DME Needed: Yes    DME Ordered: No-pending oxygen needs at DC    Action(s) Taken: OTHER    Escalations Completed: None    Medically Clear: No    Next Steps: f/u with pt and medical team to discuss dc needs and barriers.    Barriers to Discharge: Medical clearance

## 2022-09-12 NOTE — CARE PLAN
The patient is Stable - Low risk of patient condition declining or worsening    Shift Goals: Repleate electrolytes, OOB  Clinical Goals: Stable for DC  Patient Goals: DC  Family Goals: n/a    Progress made toward(s) clinical / shift goals:  Pt is up with staff. Pt requesting to get discharged today.     Patient is not progressing towards the following goals:

## 2022-09-12 NOTE — ED NOTES
Pt requesting iburofen for her headache and rib pain, unable to take tylenol due to glucometer. Sent message to .

## 2022-09-12 NOTE — ED NOTES
Pt transported to floor with ACLS RN connected to [facility]. All belongings and chart transported with pt.

## 2022-09-12 NOTE — INFECTION CONTROL
Patient states she was positive at home for COVID-19 on August 2, 2022. Tested positive again on 9/10/2022. She is considered COVID-19 recovered.

## 2022-09-12 NOTE — ED NOTES
Assisted pt to restroom, ambulated standby asst provided with walker .   Now sitting chair watching tv, advised to call before getting up. Verbalized understanding.   Changed linen,gown and underwear.

## 2022-09-12 NOTE — PROGRESS NOTES
Pt left AMA. Pt,  and niece all at bedside when RN and MD spoke to pt about the risk for leaving AMA. Pt and family agreed and left AMA after signing paper and having IV removed. Pt declined a wheelchair for DC. Pt ambulated off the floor with family.

## 2022-09-13 NOTE — DISCHARGE SUMMARY
[facility] Discharge Summary    Attending: Dr. Conley  Senior Resident: Dr. Douglas  Intern:    Contact Number: [phone]    CHIEF COMPLAINT ON ADMISSION  Chief Complaint   Patient presents with    Fall     Transfer from [place] for MGLF lumbar fx, rib fx, elevated trop       Reason for Admission   fall    Admission Date  9/10/2022    CODE STATUS  FULL    HPI & HOSPITAL COURSE  This is a 72 y.o. female with coronary artery disease, hypertension, hyperlipidemia, diabetes mellitus, GERD, depression, right scapular fracture set to go orthopedic repair 1 month in [place], who presented 9/10/2022 as a transfer from [facility] for elevated troponin.  On the day of admission, she went to a boat to on [facility] and felt dizzy.  She fell back onto her left side and then was brought to an outside hospital, found to have 2 posterior rib fractures, lumbar fracture and elevated troponin.  She was transferred to [facility] due to elevated troponin.  On admission to [facility] she is found to be hypoxic to 88% requiring 2 L nasal cannula.  She also had a mild leukocytosis of 11.6, mild anemia 10.9, sodium 130, BUN 27, creatinine 1.47, magnesium 1.3, proBNP 4572, procalcitonin 0.14 EKG revealed sinus rhythm with no PACs.  She was seen by trauma surgery for her fractures.  It was recommended that she be treated with pulmonary toilet, incentive spirometry, ibuprofen for pain control. She was admitted due to elevated troponins and acute hypoxic respiratory failure.  Potassium and magnesium have been repleted.  Troponin trended down from 108 to 64.  Procalcitonin was within normal limits at 0.14.  Echocardiogram revealed EF of 60%.   EKG revealed left axis deviation, possible Q waves in leads III and aVF.    IM team explained to patient that due to her elevated troponin, it was recommended that she obtain a stress test however patient declined and preferred to go home.  Risks and benefits were explained to patient.  She  expressed understanding and opted to leave [place].    Discharge Date  9/12/2022    Physical Exam on Day of Discharge  Physical Exam  Constitutional:       Appearance: Normal appearance.   HENT:      Head: Normocephalic and atraumatic.      Mouth/Throat:      Mouth: Mucous membranes are moist.      Pharynx: Oropharynx is clear.   Eyes:      Conjunctiva/sclera: Conjunctivae normal.      Pupils: Pupils are equal, round, and reactive to light.   Cardiovascular:      Rate and Rhythm: Normal rate.      Heart sounds: No murmur heard.  Pulmonary:      Breath sounds: Normal breath sounds. No wheezing or rales.   Abdominal:      General: Abdomen is flat. There is no distension.      Tenderness: There is no abdominal tenderness.   Musculoskeletal:         General: No swelling or deformity.   Skin:     General: Skin is warm and dry.   Neurological:      Mental Status: She is alert and oriented to person, place, and time.       FOLLOW UP ITEMS POST DISCHARGE   NA    DISCHARGE DIAGNOSES  Principal Problem:    Acute respiratory failure with hypoxia, lumbar fx/rib fracture/fall/elev troponin, hyponatremia (HCC) POA: Yes  Active Problems:    Anemia POA: Yes    Hyponatremia POA: Yes    JAKE (acute kidney injury) (HCC) POA: Yes    Elevated troponin POA: Yes    Rib fracture POA: Yes  Resolved Problems:    * No resolved hospital problems. *      FOLLOW UP  No future appointments.  No follow-up provider specified.    MEDICATIONS ON DISCHARGE     Medication List        ASK your doctor about these medications        Instructions   atorvastatin 10 MG Tabs  Commonly known as: LIPITOR  Ask about: Which instructions should I use?   Take 10 mg by mouth every day.  Dose: 10 mg     B-12 PO   Place 1 Tablet under the tongue every day.  Dose: 1 Tablet     DULoxetine 60 MG Cpep delayed-release capsule  Commonly known as: CYMBALTA  Ask about: Which instructions should I use?   Take 60 mg by mouth every day.  Dose: 60 mg     FISH OIL PO   Take 1 Capsule  "by mouth every day.  Dose: 1 Capsule     insulin lispro 100 UNIT/ML  Commonly known as: HumaLOG   Inject 1-15 Units under the skin 3 times a day before meals. Unknown sliding scale  Dose: 1-15 Units     MAGNESIUM PO   Take 1 Tablet by mouth every day.  Dose: 1 Tablet     pantoprazole 40 MG Tbec  Commonly known as: PROTONIX  Ask about: Which instructions should I use?   Take 40 mg by mouth every day.  Dose: 40 mg     POTASSIUM PO   Take 1 Tablet by mouth every day.  Dose: 1 Tablet     sodium chloride 1 GM Tabs  Commonly known as: SALT   Take 1 g by mouth every day.  Dose: 1 g     valsartan-hydrochlorothiazide 160-12.5 MG per tablet  Commonly known as: DIOVAN-HCT   Take 1 Tablet by mouth every day.  Dose: 1 Tablet              Allergies  Allergies   Allergen Reactions    Penicillins Anaphylaxis    Sulfa Drugs Anaphylaxis    Trazodone Unspecified     Per the patient it caused \"Uncontrollable Orgasms\"    Morphine Rash     Rash    Tylenol Unspecified     Messes with Glucose [place]       DIET  No orders of the defined types were placed in this encounter.      ACTIVITY  As tolerated.  Weight bearing as tolerated    CONSULTATIONS  Trauma surgery    PROCEDURES  NA      LABORATORY  Lab Results   Component Value Date    SODIUM 132 (L) 09/12/2022    POTASSIUM 3.3 (L) 09/12/2022    CHLORIDE 94 (L) 09/12/2022    CO2 30 09/12/2022    GLUCOSE 66 09/12/2022    BUN 17 09/12/2022    CREATININE 0.89 09/12/2022        Lab Results   Component Value Date    WBC 9.0 09/12/2022    HEMOGLOBIN 11.2 (L) 09/12/2022    HEMATOCRIT 33.1 (L) 09/12/2022    PLATELETCT 287 09/12/2022        Total time of the discharge process exceeds 55 minutes.  "

## 2022-09-13 NOTE — THERAPY
"Occupational Therapy   Initial Evaluation     Patient Name: Katey Mckee  Age:  72 y.o., Sex:  female  Medical Record #: [id number]  Today's Date: 9/12/2022       Precautions: Fall Risk    Assessment  Patient is 72 y.o. female with a diagnosis of GLF where she sustained rib fx & lumbar TP fx.  Pt also found to have elevated troponin.  PMHx- coronary artery disease, hypertension, hyperlipidemia, diabetes mellitus, GERD, depression, right scapular fracture set to go orthopedic repair next month in [place].  Today pt was pleasant & co-operative & eager to D/C home.  Pt has been traveling & did have a fall.  Pt reports she feels much better & has residual pain from a previous scapular fx that she is planning to have sx on next month.  Pt also feels the elevation may be playing a role in how she feels.  Today pt was able to complete basic ADL's & functional mobility with supervision. Pt will have support from family as needed upon D/C.  Pt also has all necessary AE for home use.  Anticipate that the patient will have no further occupational therapy needs after discharge from the hospital.      Plan    Recommend Occupational Therapy for Evaluation only.    DC Equipment Recommendations: None  Discharge Recommendations: Anticipate that the patient will have no further occupational therapy needs after discharge from the hospital     Subjective    \"They just told me I have to stay another night\"     Objective       09/12/22 1144   Prior Living Situation   Prior Services None   Housing / Facility 1 Story House   Steps Into Home 0   Bathroom Set up Walk In Shower   Equipment Owned 4-Wheel Walker;Quad Cane;Tub / Shower Seat   Lives with - Patient's Self Care Capacity Spouse   Comments Pt's  & neice present & very supportive.  Pt is originally from [place] & was out here on vacation   Prior Level of ADL Function   Self Feeding Independent   Grooming / Hygiene Independent   Bathing Independent   Dressing Independent "   Toileting Independent   Cognition    Comments pleasant & co-operative   Active ROM Upper Body   Comments limited by previous injury, upcoming sx next month   Strength Upper Body   Comments RUE not tested due to prior injury   Balance Assessment   Sitting Balance (Static) Good   Sitting Balance (Dynamic) Good   Standing Balance (Static) Fair +   Standing Balance (Dynamic) Fair   Weight Shift Sitting Good   Weight Shift Standing Fair   Comments pt reports she has a leg length discrepency that she wears shoes with a lift   Bed Mobility    Supine to Sit Supervised   Sit to Supine Supervised   Scooting Supervised   Rolling Modified Independent   ADL Assessment   Eating Modified Independent   Grooming Supervision;Seated   Upper Body Dressing Minimal Assist  (due to prior shoulder injury)   Lower Body Dressing Supervision   Toileting Supervision   Functional Mobility   Sit to Stand Supervised   Bed, Chair, Wheelchair Transfer Supervised   Toilet Transfers Supervised   Session Information   Date / Session Number  9/12- eval only